# Patient Record
Sex: FEMALE | Race: OTHER | NOT HISPANIC OR LATINO
[De-identification: names, ages, dates, MRNs, and addresses within clinical notes are randomized per-mention and may not be internally consistent; named-entity substitution may affect disease eponyms.]

---

## 2018-03-02 PROBLEM — Z00.00 ENCOUNTER FOR PREVENTIVE HEALTH EXAMINATION: Status: ACTIVE | Noted: 2018-03-02

## 2018-03-05 ENCOUNTER — APPOINTMENT (OUTPATIENT)
Dept: BREAST CENTER | Facility: CLINIC | Age: 58
End: 2018-03-05
Payer: COMMERCIAL

## 2018-03-05 VITALS
HEART RATE: 100 BPM | BODY MASS INDEX: 27.09 KG/M2 | DIASTOLIC BLOOD PRESSURE: 67 MMHG | HEIGHT: 60 IN | TEMPERATURE: 97.5 F | SYSTOLIC BLOOD PRESSURE: 137 MMHG | WEIGHT: 138 LBS

## 2018-03-05 DIAGNOSIS — N63.10 UNSPECIFIED LUMP IN THE RIGHT BREAST, UNSPECIFIED QUADRANT: ICD-10-CM

## 2018-03-05 DIAGNOSIS — R92.8 OTHER ABNORMAL AND INCONCLUSIVE FINDINGS ON DIAGNOSTIC IMAGING OF BREAST: ICD-10-CM

## 2018-03-05 DIAGNOSIS — Z86.39 PERSONAL HISTORY OF OTHER ENDOCRINE, NUTRITIONAL AND METABOLIC DISEASE: ICD-10-CM

## 2018-03-05 DIAGNOSIS — Z78.9 OTHER SPECIFIED HEALTH STATUS: ICD-10-CM

## 2018-03-05 PROCEDURE — 99245 OFF/OP CONSLTJ NEW/EST HI 55: CPT

## 2018-03-05 RX ORDER — LEVOTHYROXINE SODIUM 137 UG/1
TABLET ORAL
Refills: 0 | Status: ACTIVE | COMMUNITY

## 2018-03-12 ENCOUNTER — FORM ENCOUNTER (OUTPATIENT)
Age: 58
End: 2018-03-12

## 2018-03-13 ENCOUNTER — OUTPATIENT (OUTPATIENT)
Dept: OUTPATIENT SERVICES | Facility: HOSPITAL | Age: 58
LOS: 1 days | End: 2018-03-13
Payer: COMMERCIAL

## 2018-03-13 ENCOUNTER — APPOINTMENT (OUTPATIENT)
Dept: ULTRASOUND IMAGING | Facility: HOSPITAL | Age: 58
End: 2018-03-13
Payer: COMMERCIAL

## 2018-03-13 ENCOUNTER — APPOINTMENT (OUTPATIENT)
Dept: MRI IMAGING | Facility: HOSPITAL | Age: 58
End: 2018-03-13
Payer: COMMERCIAL

## 2018-03-13 PROCEDURE — A9585: CPT

## 2018-03-13 PROCEDURE — 77059 MRI BREAST BILATERAL: CPT | Mod: 26

## 2018-03-13 PROCEDURE — 0159T: CPT | Mod: 26

## 2018-03-13 PROCEDURE — 76641 ULTRASOUND BREAST COMPLETE: CPT | Mod: 26,50

## 2018-03-13 PROCEDURE — C8937: CPT

## 2018-03-13 PROCEDURE — C8906: CPT

## 2018-03-13 PROCEDURE — 76641 ULTRASOUND BREAST COMPLETE: CPT

## 2018-03-16 ENCOUNTER — RESULT REVIEW (OUTPATIENT)
Age: 58
End: 2018-03-16

## 2018-03-16 ENCOUNTER — OUTPATIENT (OUTPATIENT)
Dept: OUTPATIENT SERVICES | Facility: HOSPITAL | Age: 58
LOS: 1 days | End: 2018-03-16
Payer: COMMERCIAL

## 2018-03-16 PROCEDURE — 88321 CONSLTJ&REPRT SLD PREP ELSWR: CPT

## 2018-03-19 ENCOUNTER — FORM ENCOUNTER (OUTPATIENT)
Age: 58
End: 2018-03-19

## 2018-03-19 DIAGNOSIS — C50.911 MALIGNANT NEOPLASM OF UNSPECIFIED SITE OF RIGHT FEMALE BREAST: ICD-10-CM

## 2018-03-19 LAB — SURGICAL PATHOLOGY STUDY: SIGNIFICANT CHANGE UP

## 2018-03-20 ENCOUNTER — APPOINTMENT (OUTPATIENT)
Dept: ULTRASOUND IMAGING | Facility: HOSPITAL | Age: 58
End: 2018-03-20
Payer: COMMERCIAL

## 2018-03-20 ENCOUNTER — OUTPATIENT (OUTPATIENT)
Dept: OUTPATIENT SERVICES | Facility: HOSPITAL | Age: 58
LOS: 1 days | End: 2018-03-20
Payer: COMMERCIAL

## 2018-03-20 ENCOUNTER — RESULT REVIEW (OUTPATIENT)
Age: 58
End: 2018-03-20

## 2018-03-20 PROCEDURE — 77065 DX MAMMO INCL CAD UNI: CPT

## 2018-03-20 PROCEDURE — A4648: CPT

## 2018-03-20 PROCEDURE — 88341 IMHCHEM/IMCYTCHM EA ADD ANTB: CPT

## 2018-03-20 PROCEDURE — 19083 BX BREAST 1ST LESION US IMAG: CPT

## 2018-03-20 PROCEDURE — 19083 BX BREAST 1ST LESION US IMAG: CPT | Mod: RT

## 2018-03-20 PROCEDURE — 77065 DX MAMMO INCL CAD UNI: CPT | Mod: 26,RT

## 2018-03-20 PROCEDURE — 88305 TISSUE EXAM BY PATHOLOGIST: CPT

## 2018-03-22 LAB — SURGICAL PATHOLOGY STUDY: SIGNIFICANT CHANGE UP

## 2018-03-27 ENCOUNTER — FORM ENCOUNTER (OUTPATIENT)
Age: 58
End: 2018-03-27

## 2018-03-28 ENCOUNTER — OUTPATIENT (OUTPATIENT)
Dept: OUTPATIENT SERVICES | Facility: HOSPITAL | Age: 58
LOS: 1 days | End: 2018-03-28
Payer: COMMERCIAL

## 2018-03-28 ENCOUNTER — RESULT REVIEW (OUTPATIENT)
Age: 58
End: 2018-03-28

## 2018-03-28 ENCOUNTER — FORM ENCOUNTER (OUTPATIENT)
Age: 58
End: 2018-03-28

## 2018-03-28 VITALS
RESPIRATION RATE: 18 BRPM | WEIGHT: 149.25 LBS | SYSTOLIC BLOOD PRESSURE: 118 MMHG | HEIGHT: 60 IN | HEART RATE: 78 BPM | DIASTOLIC BLOOD PRESSURE: 56 MMHG | TEMPERATURE: 97 F

## 2018-03-28 DIAGNOSIS — Z98.891 HISTORY OF UTERINE SCAR FROM PREVIOUS SURGERY: Chronic | ICD-10-CM

## 2018-03-28 DIAGNOSIS — Z98.890 OTHER SPECIFIED POSTPROCEDURAL STATES: Chronic | ICD-10-CM

## 2018-03-28 PROCEDURE — A9541: CPT

## 2018-03-28 PROCEDURE — 78195 LYMPH SYSTEM IMAGING: CPT | Mod: 26

## 2018-03-28 PROCEDURE — 78195 LYMPH SYSTEM IMAGING: CPT

## 2018-03-29 ENCOUNTER — APPOINTMENT (OUTPATIENT)
Dept: BREAST CENTER | Facility: HOSPITAL | Age: 58
End: 2018-03-29

## 2018-03-29 ENCOUNTER — APPOINTMENT (OUTPATIENT)
Dept: MAMMOGRAPHY | Facility: HOSPITAL | Age: 58
End: 2018-03-29

## 2018-03-29 ENCOUNTER — OUTPATIENT (OUTPATIENT)
Dept: OUTPATIENT SERVICES | Facility: HOSPITAL | Age: 58
LOS: 1 days | Discharge: HOME | End: 2018-03-29
Payer: COMMERCIAL

## 2018-03-29 VITALS
HEART RATE: 92 BPM | RESPIRATION RATE: 13 BRPM | DIASTOLIC BLOOD PRESSURE: 65 MMHG | OXYGEN SATURATION: 97 % | SYSTOLIC BLOOD PRESSURE: 111 MMHG

## 2018-03-29 DIAGNOSIS — Z98.890 OTHER SPECIFIED POSTPROCEDURAL STATES: Chronic | ICD-10-CM

## 2018-03-29 DIAGNOSIS — Z98.891 HISTORY OF UTERINE SCAR FROM PREVIOUS SURGERY: Chronic | ICD-10-CM

## 2018-03-29 LAB — GLUCOSE BLDC GLUCOMTR-MCNC: 121 MG/DL — HIGH (ref 70–99)

## 2018-03-29 PROCEDURE — 88305 TISSUE EXAM BY PATHOLOGIST: CPT

## 2018-03-29 PROCEDURE — 19301 PARTIAL MASTECTOMY: CPT | Mod: RT,GC

## 2018-03-29 PROCEDURE — 76098 X-RAY EXAM SURGICAL SPECIMEN: CPT | Mod: 26

## 2018-03-29 PROCEDURE — 19301 PARTIAL MASTECTOMY: CPT | Mod: RT

## 2018-03-29 PROCEDURE — 88341 IMHCHEM/IMCYTCHM EA ADD ANTB: CPT

## 2018-03-29 PROCEDURE — 77065 DX MAMMO INCL CAD UNI: CPT

## 2018-03-29 PROCEDURE — 19125 EXCISION BREAST LESION: CPT | Mod: 59,RT,GC

## 2018-03-29 PROCEDURE — 82962 GLUCOSE BLOOD TEST: CPT

## 2018-03-29 PROCEDURE — 38500 BIOPSY/REMOVAL LYMPH NODES: CPT | Mod: RT

## 2018-03-29 PROCEDURE — 19285 PERQ DEV BREAST 1ST US IMAG: CPT

## 2018-03-29 PROCEDURE — 19286 PERQ DEV BREAST ADD US IMAG: CPT

## 2018-03-29 PROCEDURE — 77065 DX MAMMO INCL CAD UNI: CPT | Mod: 26,RT

## 2018-03-29 PROCEDURE — 38900 IO MAP OF SENT LYMPH NODE: CPT

## 2018-03-29 PROCEDURE — 19286 PERQ DEV BREAST ADD US IMAG: CPT | Mod: RT

## 2018-03-29 PROCEDURE — 88307 TISSUE EXAM BY PATHOLOGIST: CPT

## 2018-03-29 PROCEDURE — 19285 PERQ DEV BREAST 1ST US IMAG: CPT | Mod: RT

## 2018-03-29 PROCEDURE — C1819: CPT

## 2018-03-29 PROCEDURE — 76098 X-RAY EXAM SURGICAL SPECIMEN: CPT

## 2018-03-29 RX ORDER — OXYCODONE AND ACETAMINOPHEN 5; 325 MG/1; MG/1
1 TABLET ORAL ONCE
Qty: 0 | Refills: 0 | Status: DISCONTINUED | OUTPATIENT
Start: 2018-03-29 | End: 2018-03-29

## 2018-03-29 RX ADMIN — OXYCODONE AND ACETAMINOPHEN 1 TABLET(S): 5; 325 TABLET ORAL at 11:43

## 2018-03-29 NOTE — PACU DISCHARGE NOTE - COMMENTS
VSS. Surical incision C/D/I. Tolerated PO fluids Discharge instructions given. Pt shows understanding.

## 2018-04-02 ENCOUNTER — CHART COPY (OUTPATIENT)
Age: 58
End: 2018-04-02

## 2018-04-04 ENCOUNTER — APPOINTMENT (OUTPATIENT)
Dept: BREAST CENTER | Facility: CLINIC | Age: 58
End: 2018-04-04
Payer: COMMERCIAL

## 2018-04-04 VITALS
DIASTOLIC BLOOD PRESSURE: 65 MMHG | WEIGHT: 138 LBS | SYSTOLIC BLOOD PRESSURE: 123 MMHG | BODY MASS INDEX: 27.09 KG/M2 | TEMPERATURE: 98.8 F | HEART RATE: 86 BPM | HEIGHT: 60 IN

## 2018-04-04 PROBLEM — C50.919 MALIGNANT NEOPLASM OF UNSPECIFIED SITE OF UNSPECIFIED FEMALE BREAST: Chronic | Status: ACTIVE | Noted: 2018-03-28

## 2018-04-04 PROBLEM — E66.3 OVERWEIGHT: Chronic | Status: ACTIVE | Noted: 2018-03-28

## 2018-04-04 PROBLEM — E05.90 THYROTOXICOSIS, UNSPECIFIED WITHOUT THYROTOXIC CRISIS OR STORM: Chronic | Status: ACTIVE | Noted: 2018-03-28

## 2018-04-04 PROCEDURE — 99024 POSTOP FOLLOW-UP VISIT: CPT

## 2018-04-06 ENCOUNTER — CHART COPY (OUTPATIENT)
Age: 58
End: 2018-04-06

## 2018-04-06 LAB — SURGICAL PATHOLOGY STUDY: SIGNIFICANT CHANGE UP

## 2018-04-10 ENCOUNTER — APPOINTMENT (OUTPATIENT)
Age: 58
End: 2018-04-10
Payer: COMMERCIAL

## 2018-04-10 VITALS
HEIGHT: 60 IN | BODY MASS INDEX: 27.37 KG/M2 | SYSTOLIC BLOOD PRESSURE: 120 MMHG | HEART RATE: 84 BPM | RESPIRATION RATE: 18 BRPM | OXYGEN SATURATION: 99 % | DIASTOLIC BLOOD PRESSURE: 71 MMHG | WEIGHT: 139.4 LBS

## 2018-04-10 DIAGNOSIS — E11.9 TYPE 2 DIABETES MELLITUS W/OUT COMPLICATIONS: ICD-10-CM

## 2018-04-10 DIAGNOSIS — Z80.3 FAMILY HISTORY OF MALIGNANT NEOPLASM OF BREAST: ICD-10-CM

## 2018-04-10 PROCEDURE — 99244 OFF/OP CNSLTJ NEW/EST MOD 40: CPT | Mod: 25

## 2018-04-10 PROCEDURE — 77263 THER RADIOLOGY TX PLNG CPLX: CPT

## 2018-04-17 PROCEDURE — 77334 RADIATION TREATMENT AID(S): CPT | Mod: 26

## 2018-04-17 PROCEDURE — 77290 THER RAD SIMULAJ FIELD CPLX: CPT | Mod: 26

## 2018-05-03 ENCOUNTER — CHART COPY (OUTPATIENT)
Age: 58
End: 2018-05-03

## 2018-05-09 PROCEDURE — 77334 RADIATION TREATMENT AID(S): CPT | Mod: 26

## 2018-05-09 PROCEDURE — 77300 RADIATION THERAPY DOSE PLAN: CPT | Mod: 26

## 2018-05-09 PROCEDURE — 77295 3-D RADIOTHERAPY PLAN: CPT | Mod: 26

## 2018-05-14 PROCEDURE — 77280 THER RAD SIMULAJ FIELD SMPL: CPT | Mod: 26

## 2018-05-15 PROCEDURE — 77014: CPT | Mod: 26

## 2018-05-16 PROCEDURE — 77014: CPT | Mod: 26

## 2018-05-17 PROCEDURE — 77014: CPT | Mod: 26

## 2018-05-18 PROCEDURE — 77014: CPT | Mod: 26

## 2018-05-21 PROCEDURE — 77427 RADIATION TX MANAGEMENT X5: CPT

## 2018-05-21 PROCEDURE — 77014: CPT | Mod: 26

## 2018-05-22 PROCEDURE — 77014: CPT | Mod: 26

## 2018-05-23 PROCEDURE — 77014: CPT | Mod: 26

## 2018-05-24 PROCEDURE — 77014: CPT | Mod: 26

## 2018-05-25 PROCEDURE — 77014: CPT | Mod: 26

## 2018-05-29 ENCOUNTER — APPOINTMENT (OUTPATIENT)
Dept: BREAST CENTER | Facility: CLINIC | Age: 58
End: 2018-05-29

## 2018-05-29 PROCEDURE — 77427 RADIATION TX MANAGEMENT X5: CPT

## 2018-05-29 PROCEDURE — 77014: CPT | Mod: 26

## 2018-05-30 PROCEDURE — 77014: CPT | Mod: 26

## 2018-05-31 PROCEDURE — 77014: CPT | Mod: 26

## 2018-06-01 PROCEDURE — 77014: CPT | Mod: 26

## 2018-06-04 PROCEDURE — 77014: CPT | Mod: 26

## 2018-06-05 PROCEDURE — 77427 RADIATION TX MANAGEMENT X5: CPT

## 2018-06-05 PROCEDURE — 77014: CPT | Mod: 26

## 2018-06-06 PROCEDURE — 77014: CPT | Mod: 26

## 2018-06-07 PROCEDURE — 77280 THER RAD SIMULAJ FIELD SMPL: CPT | Mod: 26

## 2018-06-07 PROCEDURE — 77014: CPT | Mod: 26

## 2018-06-07 PROCEDURE — 77300 RADIATION THERAPY DOSE PLAN: CPT | Mod: 26

## 2018-06-07 PROCEDURE — 77334 RADIATION TREATMENT AID(S): CPT | Mod: 26

## 2018-06-08 PROCEDURE — 77014: CPT | Mod: 26

## 2018-06-11 PROCEDURE — 77014: CPT | Mod: 26

## 2018-06-12 PROCEDURE — 77427 RADIATION TX MANAGEMENT X5: CPT

## 2018-06-12 PROCEDURE — 77014: CPT | Mod: 26

## 2018-07-10 ENCOUNTER — APPOINTMENT (OUTPATIENT)
Dept: BREAST CENTER | Facility: CLINIC | Age: 58
End: 2018-07-10
Payer: COMMERCIAL

## 2018-07-10 VITALS
HEIGHT: 60 IN | HEART RATE: 89 BPM | BODY MASS INDEX: 27.29 KG/M2 | WEIGHT: 139 LBS | DIASTOLIC BLOOD PRESSURE: 63 MMHG | SYSTOLIC BLOOD PRESSURE: 99 MMHG

## 2018-07-10 PROCEDURE — 99213 OFFICE O/P EST LOW 20 MIN: CPT

## 2018-07-17 ENCOUNTER — APPOINTMENT (OUTPATIENT)
Age: 58
End: 2018-07-17

## 2018-07-17 VITALS
DIASTOLIC BLOOD PRESSURE: 70 MMHG | BODY MASS INDEX: 26.43 KG/M2 | HEART RATE: 95 BPM | WEIGHT: 140 LBS | HEIGHT: 61 IN | RESPIRATION RATE: 14 BRPM | SYSTOLIC BLOOD PRESSURE: 111 MMHG | OXYGEN SATURATION: 99 %

## 2018-09-05 ENCOUNTER — APPOINTMENT (OUTPATIENT)
Dept: MAMMOGRAPHY | Facility: HOSPITAL | Age: 58
End: 2018-09-05
Payer: COMMERCIAL

## 2018-09-05 ENCOUNTER — OUTPATIENT (OUTPATIENT)
Dept: OUTPATIENT SERVICES | Facility: HOSPITAL | Age: 58
LOS: 1 days | End: 2018-09-05
Payer: COMMERCIAL

## 2018-09-05 ENCOUNTER — APPOINTMENT (OUTPATIENT)
Dept: ULTRASOUND IMAGING | Facility: HOSPITAL | Age: 58
End: 2018-09-05
Payer: COMMERCIAL

## 2018-09-05 DIAGNOSIS — Z98.890 OTHER SPECIFIED POSTPROCEDURAL STATES: Chronic | ICD-10-CM

## 2018-09-05 DIAGNOSIS — Z98.891 HISTORY OF UTERINE SCAR FROM PREVIOUS SURGERY: Chronic | ICD-10-CM

## 2018-09-05 PROCEDURE — 76641 ULTRASOUND BREAST COMPLETE: CPT | Mod: 26,50

## 2018-09-05 PROCEDURE — 77065 DX MAMMO INCL CAD UNI: CPT

## 2018-09-05 PROCEDURE — G0279: CPT

## 2018-09-05 PROCEDURE — 77065 DX MAMMO INCL CAD UNI: CPT | Mod: 26,RT

## 2018-09-05 PROCEDURE — 76641 ULTRASOUND BREAST COMPLETE: CPT

## 2018-09-05 PROCEDURE — G0279: CPT | Mod: 26

## 2018-09-12 ENCOUNTER — APPOINTMENT (OUTPATIENT)
Dept: BREAST CENTER | Facility: CLINIC | Age: 58
End: 2018-09-12
Payer: COMMERCIAL

## 2018-09-12 VITALS
SYSTOLIC BLOOD PRESSURE: 129 MMHG | HEART RATE: 102 BPM | HEIGHT: 61 IN | DIASTOLIC BLOOD PRESSURE: 83 MMHG | WEIGHT: 140 LBS | BODY MASS INDEX: 26.43 KG/M2

## 2018-09-12 DIAGNOSIS — Z12.31 ENCOUNTER FOR SCREENING MAMMOGRAM FOR MALIGNANT NEOPLASM OF BREAST: ICD-10-CM

## 2018-09-12 DIAGNOSIS — Z85.3 PERSONAL HISTORY OF MALIGNANT NEOPLASM OF BREAST: ICD-10-CM

## 2018-09-12 DIAGNOSIS — Z87.891 PERSONAL HISTORY OF NICOTINE DEPENDENCE: ICD-10-CM

## 2018-09-12 PROCEDURE — 99213 OFFICE O/P EST LOW 20 MIN: CPT

## 2018-10-16 ENCOUNTER — APPOINTMENT (OUTPATIENT)
Dept: RADIATION ONCOLOGY | Facility: CLINIC | Age: 58
End: 2018-10-16
Payer: COMMERCIAL

## 2018-10-16 PROCEDURE — 99214 OFFICE O/P EST MOD 30 MIN: CPT

## 2018-10-16 RX ORDER — ANASTROZOLE TABLETS 1 MG/1
1 TABLET ORAL
Refills: 0 | Status: ACTIVE | COMMUNITY

## 2018-10-16 NOTE — REVIEW OF SYSTEMS
[Negative] : Respiratory [Fever] : no fever [Chills] : no chills [Visual Disturbances] : no visual disturbances [Chest Pain] : no chest pain [Palpitations] : no palpitations [Shortness Of Breath] : no shortness of breath [Cough] : no cough [Hot Flashes] : no hot flashes [Swollen Glands] : no swollen glands [FreeTextEntry2] : reports fatigue that is stable.  [de-identified] : reports no skin darkening or reddening

## 2018-10-16 NOTE — HISTORY OF PRESENT ILLNESS
[FreeTextEntry1] : Ms. 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 She is s/p  right breast wide excision with SLNB.\par \par 10/16/18- FOLLOW UP\par Ms. Chan returns for routine follow up. She was last seen on 7/17/18; plan at the time was to continue follow up with Dr. Swan and Dr. Conway, and to obtain imaging as per Dr. Swan. She completed her right breast mammo and bilateral US on 9/5/18 showing postoperative and postradiation changes in the right breast.  \par \par She last saw Dr. Conway approx one month ago, continues on Anastrozole (started end of June 2018). \par She last saw Dr. Swan on 9/12/18; plan was to see lymphedema specialist and follow up in three months. However, Dr. Conway advised patient to hold off, and patient reports the edema has gotten significantly better. \par \par Today, she reports she is feeling well. Denies fever, chills, SOB, CP, breast pain. She notes hot flashes, but she was experiencing them before starting Anastrozole. \par \par \par 7/17/18-PTE\par Ms. 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 She is s/p  right breast wide excision with SLNB.\par She has f/u with Dr. Swan  and was noted to have a left breast mass seen on sono. She will undergo a bilateral sono and right breast mammo in September. She is taking anastrazole under the care of Dr. Conway\par \par ONCOLOGY HISTORY\par Ms Geri Chan is a 57 year old female who presents with invasive ductal carcinoma and LCIS of the right breast.\par Her PMH includes a biopsy of right breast lesion in 2016 which was benign and  excision of right breast nevus few years ago. Her mother was diagnosed with breast cancer at  70.\par \par On 1/31/18 she had her annual breast mammogram showing nodular asymmetry in the right breast MLO 14 cm FN. \par \par On 2/5/18 she underwent a  right breast mammo and ultrasound showing 1 by 0.6 cm hypoechoic mass in the right breast at 11;00 20 cm FN. On 2/7/18 pt had an ultrasound of the right axilla showing 2/8 by 0/7 lymph node ; and 2/6 by 0.8 cm lymph node in the left axilla.\par \par On 2/16/18 pt had core biopsy of the right breast mass  at [facility] showing invasive moderately to poorly differentiated ductal carcinoma  measuring 10 mm  with PNI, ER/CT positive, HER-2 neutral (path [place] and reviewed by [facility])\par \par 3/5/18- Right mammo showed 2.2 cm irregular nodular density in the right axillary region 14 cm FN.  Right breast ultrasound  showed  a 1 x 0.6 cm hypoechoic mass at 11;00 20 cm FN\par \par 3/13/18-MRI breasts showed\par right breast- 2.1 x 0.9 x 1.7 cm enhancing lesion, 1.4 x 0.8 x 0.4 cm confluent enhancement at 9:00, 7 cm FN\par left breast-0.6 x 0.4 x 0.6 cm enhancing lesion at 1:00, 5 cm FN, 0.9 x 0.7 x 0.6 cm intramammary lymph node at 3:00, 8cm FN\par \par 3/20/10-Core biopsy right breast at [facility] by Dr. Daniella Majano- atypical lobular hyperplasia\par \par She underwent a right breast lumpectomy on 3/29/18 by Dr. Swan. Pathology showed  \par 1-right breast 12:00 invasive ductal carcinoma  1.4 cm, grade G2. Margins negative greater than 10 mm. 3 sentinel nodes negative.\par \par Today pt. feels well. Still with right breast pain 2/10. Here to discuss RT options\par

## 2018-10-16 NOTE — VITALS
[Least Pain Intensity: 0/10] : 0/10 [Pain Location: ___] : Pain Location: [unfilled] [NoTreatment Scheduled] : no treatment scheduled [90: Able to carry normal activity; minor signs or symptoms of disease.] : 90: Able to carry normal activity; minor signs or symptoms of disease.  [ECOG Performance Status: 1 - Restricted in physically strenuous activity but ambulatory and able to carry out work of a light or sedentary nature] : Performance Status: 1 - Restricted in physically strenuous activity but ambulatory and able to carry out work of a light or sedentary nature, e.g., light house work, office work [Maximal Pain Intensity: 0/10] : 0/10

## 2018-10-16 NOTE — PHYSICAL EXAM
[General Appearance - Alert] : alert [General Appearance - In No Acute Distress] : in no acute distress [Sclera] : the sclera and conjunctiva were normal [Extraocular Movements] : extraocular movements were intact [Outer Ear] : the ears and nose were normal in appearance [Hearing Threshold Finger Rub Not [place]] : hearing was normal [] : no respiratory distress [Heart Rate And Rhythm] : heart rate and rhythm were normal [Oriented To Time, Place, And Person] : oriented to person, place, and time [General Appearance - Well Developed] : well developed [General Appearance - Well Nourished] : well nourished [Respiration, Rhythm And Depth] : normal respiratory rhythm and effort [Auscultation Breath Sounds / Voice Sounds] : lungs were clear to auscultation bilaterally [Heart Sounds] : normal S1 and S2 [Bowel Sounds] : normal bowel sounds [Abdomen Soft] : soft [Nondistended] : nondistended [Abdomen Tenderness] : non-tender [Cervical Lymph Nodes Enlarged Posterior Bilaterally] : posterior cervical [Cervical Lymph Nodes Enlarged Anterior Bilaterally] : anterior cervical [Supraclavicular Lymph Nodes Enlarged Bilaterally] : supraclavicular [Axillary Lymph Nodes Enlarged Bilaterally] : axillary [Musculoskeletal - Swelling] : no joint swelling [Motor Tone] : muscle strength and tone were normal [Normal] : no focal deficits [de-identified] : post-surgical changes palpated. slight hyperpigmentation noted.

## 2019-01-15 ENCOUNTER — APPOINTMENT (OUTPATIENT)
Dept: BREAST CENTER | Facility: CLINIC | Age: 59
End: 2019-01-15

## 2019-02-26 NOTE — VITALS
[Maximal Pain Intensity: 0/10] : 0/10 [Least Pain Intensity: 0/10] : 0/10 [Pain Location: ___] : Pain Location: [unfilled] [NoTreatment Scheduled] : no treatment scheduled [90: Able to carry normal activity; minor signs or symptoms of disease.] : 90: Able to carry normal activity; minor signs or symptoms of disease.  [ECOG Performance Status: 1 - Restricted in physically strenuous activity but ambulatory and able to carry out work of a light or sedentary nature] : Performance Status: 1 - Restricted in physically strenuous activity but ambulatory and able to carry out work of a light or sedentary nature, e.g., light house work, office work

## 2019-02-27 ENCOUNTER — APPOINTMENT (OUTPATIENT)
Dept: RADIATION ONCOLOGY | Facility: CLINIC | Age: 59
End: 2019-02-27
Payer: COMMERCIAL

## 2019-02-27 VITALS
DIASTOLIC BLOOD PRESSURE: 67 MMHG | BODY MASS INDEX: 26.43 KG/M2 | WEIGHT: 140 LBS | TEMPERATURE: 98 F | HEIGHT: 61 IN | HEART RATE: 102 BPM | OXYGEN SATURATION: 97 % | SYSTOLIC BLOOD PRESSURE: 103 MMHG | RESPIRATION RATE: 14 BRPM

## 2019-02-27 PROCEDURE — 99214 OFFICE O/P EST MOD 30 MIN: CPT

## 2019-02-28 NOTE — REVIEW OF SYSTEMS
[Negative] : Psychiatric [Fever] : no fever [Chills] : no chills [Visual Disturbances] : no visual disturbances [Chest Pain] : no chest pain [Palpitations] : no palpitations [Shortness Of Breath] : no shortness of breath [Cough] : no cough [Hot Flashes] : no hot flashes [Swollen Glands] : no swollen glands [FreeTextEntry2] : reports fatigue that is stable.  [de-identified] : reports no skin darkening or reddening

## 2019-02-28 NOTE — HISTORY OF PRESENT ILLNESS
[FreeTextEntry1] : Ms. Chan completed 5040 cGy for a malignant neoplasm of upper-outer quadrant of the right breast ER/AL positive, HER 2 negative from 5/15/18-6/12/18. She is s/p  right breast wide excision with SLNB.\par \par 2/27/19 Follow up\par Ms Chan presents for routine follow up. Last visit with Dr Conway was ___, continues on Anastrazole. Last visit with Dr Swan was __. Breast imaging? Today, she reports XXX \par \par ____________________________________\par 10/16/18- FOLLOW UP\par Ms. Chan returns for routine follow up. She was last seen on 7/17/18; plan at the time was to continue follow up with Dr. Swan and Dr. Conway, and to obtain imaging as per Dr. Swan. She completed her right breast mammo and bilateral US on 9/5/18 showing postoperative and postradiation changes in the right breast.  \par \par She last saw Dr. Conway approx one month ago, continues on Anastrozole (started end of June 2018). \par She last saw Dr. Swan on 9/12/18; plan was to see lymphedema specialist and follow up in three months. However, Dr. Conway advised patient to hold off, and patient reports the edema has gotten significantly better. \par \par Today, she reports she is feeling well. Denies fever, chills, SOB, CP, breast pain. She notes hot flashes, but she was experiencing them before starting Anastrozole. \par \par \par 7/17/18-PTE\par Ms. Chan completed 5040 cGy for a malignant neoplasm of upper-outer quadrant of the right breast ER/AL positive, HER 2 negative from 5/15/18-6/12/18. She is s/p  right breast wide excision with SLNB.\par She has f/u with Dr. Swan  and was noted to have a left breast mass seen on sono. She will undergo a bilateral sono and right breast mammo in September. She is taking anastrazole under the care of Dr. Conway\par \par ONCOLOGY HISTORY\par Ms Geri Chan is a 57 year old female who presents with invasive ductal carcinoma and LCIS of the right breast.\par Her PMH includes a biopsy of right breast lesion in 2016 which was benign and  excision of right breast nevus few years ago. Her mother was diagnosed with breast cancer at  70.\par \par On 1/31/18 she had her annual breast mammogram showing nodular asymmetry in the right breast MLO 14 cm FN. \par \par On 2/5/18 she underwent a  right breast mammo and ultrasound showing 1 by 0.6 cm hypoechoic mass in the right breast at 11;00 20 cm FN. On 2/7/18 pt had an ultrasound of the right axilla showing 2/8 by 0/7 lymph node ; and 2/6 by 0.8 cm lymph node in the left axilla.\par \par On 2/16/18 pt had core biopsy of the right breast mass  at [facility]) showing invasive moderately to poorly differentiated ductal carcinoma  measuring 10 mm  with PNI, ER/AL positive, HER-2 neutral (path [place] and reviewed by [facility])\par \par 3/5/18- Right mammo showed 2.2 cm irregular nodular density in the right axillary region 14 cm FN.  Right breast ultrasound  showed  a 1 x 0.6 cm hypoechoic mass at 11;00 20 cm FN\par \par 3/13/18-MRI breasts showed\par right breast- 2.1 x 0.9 x 1.7 cm enhancing lesion, 1.4 x 0.8 x 0.4 cm confluent enhancement at 9:00, 7 cm FN\par left breast-0.6 x 0.4 x 0.6 cm enhancing lesion at 1:00, 5 cm FN, 0.9 x 0.7 x 0.6 cm intramammary lymph node at 3:00, 8cm FN\par \par 3/20/10-Core biopsy right breast at [facility] by Dr. Daniella Majano- atypical lobular hyperplasia\par \par She underwent a right breast lumpectomy on 3/29/18 by Dr. Swan. Pathology showed  \par 1-right breast 12:00 invasive ductal carcinoma  1.4 cm, grade G2. Margins negative greater than 10 mm. 3 sentinel nodes negative.\par \par Today pt. feels well. Still with right breast pain 2/10. Here to discuss RT options\par

## 2019-02-28 NOTE — PHYSICAL EXAM
[General Appearance - Well Developed] : well developed [General Appearance - Well Nourished] : well nourished [General Appearance - Alert] : alert [General Appearance - In No Acute Distress] : in no acute distress [Sclera] : the sclera and conjunctiva were normal [Extraocular Movements] : extraocular movements were intact [Outer Ear] : the ears and nose were normal in appearance [Hearing Threshold Finger Rub Not [place]] : hearing was normal [] : no respiratory distress [Respiration, Rhythm And Depth] : normal respiratory rhythm and effort [Auscultation Breath Sounds / Voice Sounds] : lungs were clear to auscultation bilaterally [Heart Rate And Rhythm] : heart rate and rhythm were normal [Heart Sounds] : normal S1 and S2 [Bowel Sounds] : normal bowel sounds [Abdomen Soft] : soft [Nondistended] : nondistended [Abdomen Tenderness] : non-tender [Cervical Lymph Nodes Enlarged Posterior Bilaterally] : posterior cervical [Cervical Lymph Nodes Enlarged Anterior Bilaterally] : anterior cervical [Supraclavicular Lymph Nodes Enlarged Bilaterally] : supraclavicular [Axillary Lymph Nodes Enlarged Bilaterally] : axillary [Musculoskeletal - Swelling] : no joint swelling [Motor Tone] : muscle strength and tone were normal [Normal] : no focal deficits [Oriented To Time, Place, And Person] : oriented to person, place, and time [de-identified] : post-surgical changes palpated. slight hyperpigmentation noted.

## 2019-08-14 ENCOUNTER — APPOINTMENT (OUTPATIENT)
Dept: RADIATION ONCOLOGY | Facility: CLINIC | Age: 59
End: 2019-08-14

## 2019-08-27 ENCOUNTER — APPOINTMENT (OUTPATIENT)
Dept: RADIATION ONCOLOGY | Facility: CLINIC | Age: 59
End: 2019-08-27
Payer: COMMERCIAL

## 2019-08-27 DIAGNOSIS — C50.911 MALIGNANT NEOPLASM OF UNSPECIFIED SITE OF RIGHT FEMALE BREAST: ICD-10-CM

## 2019-08-27 PROCEDURE — 99214 OFFICE O/P EST MOD 30 MIN: CPT

## 2019-08-27 NOTE — REVIEW OF SYSTEMS
[Negative] : Integumentary [Breast Pain: Grade 0] : Breast Pain: Grade 0 [Fever] : no fever [Chills] : no chills [Visual Disturbances] : no visual disturbances [Palpitations] : no palpitations [Chest Pain] : no chest pain [Shortness Of Breath] : no shortness of breath [Hot Flashes] : no hot flashes [Cough] : no cough [Swollen Glands] : no swollen glands [FreeTextEntry2] : reports fatigue that is stable.

## 2019-08-27 NOTE — HISTORY OF PRESENT ILLNESS
[FreeTextEntry1] : Ms. Chan completed 5240 cGy over 20 fractions to the RIGHT breast from 5/15/18- 6/12/18 for a [id number] ER positive, OK positive, HER-2 negative malignant neoplasm of upper-outer quadrant of the right breast, AJCC stage IB. She is s/p right breast wide excision with SLNB.\par \par 8/27/19- FOLLOW UP\par Ms. Chan returns for routine follow up. When she was last seen on 2/27/19, she was GENET; plan was to continue follow up with Dr. Conway and breast surgeon. She last saw Dr. Conway approx one month ago; continues on Anastrozole. She has not followed up with breast surgery; states her PCP is ordering breast imaging. As per patient, her last breast imaging was in the past six months and was reportedly benign.\par \par Today, she reports she is feeling well. Denies breast pain, edema, palpable masses, CP, SOB, or any other complaints. \par \par \par \par \par ____________________________________\par 10/16/18- FOLLOW UP\par Ms. Chan returns for routine follow up. She was last seen on 7/17/18; plan at the time was to continue follow up with Dr. Swan and Dr. Conway, and to obtain imaging as per Dr. Swan. She completed her right breast mammo and bilateral US on 9/5/18 showing postoperative and postradiation changes in the right breast.  \par \par She last saw Dr. Conway approx one month ago, continues on Anastrozole (started end of June 2018). \par She last saw Dr. Swan on 9/12/18; plan was to see lymphedema specialist and follow up in three months. However, Dr. Conway advised patient to hold off, and patient reports the edema has gotten significantly better. \par \par Today, she reports she is feeling well. Denies fever, chills, SOB, CP, breast pain. She notes hot flashes, but she was experiencing them before starting Anastrozole. \par \par \par 7/17/18-PTE\par Ms. Chan completed 5040 cGy for a malignant neoplasm of upper-outer quadrant of the right breast ER/OK positive, HER 2 negative from 5/15/18-6/12/18. She is s/p  right breast wide excision with SLNB.\par She has f/u with Dr. Swan  and was noted to have a left breast mass seen on sono. She will undergo a bilateral sono and right breast mammo in September. She is taking anastrazole under the care of Dr. Conway\par \par ONCOLOGY HISTORY\par Ms Geri Chan is a 57 year old female who presents with invasive ductal carcinoma and LCIS of the right breast.\par Her PMH includes a biopsy of right breast lesion in 2016 which was benign and  excision of right breast nevus few years ago. Her mother was diagnosed with breast cancer at  70.\par \par On 1/31/18 she had her annual breast mammogram showing nodular asymmetry in the right breast MLO 14 cm FN. \par \par On 2/5/18 she underwent a  right breast mammo and ultrasound showing 1 by 0.6 cm hypoechoic mass in the right breast at 11;00 20 cm FN. On 2/7/18 pt had an ultrasound of the right axilla showing 2/8 by 0/7 lymph node ; and 2/6 by 0.8 cm lymph node in the left axilla.\par \par On 2/16/18 pt had core biopsy of the right breast mass  at [facility]) showing invasive moderately to poorly differentiated ductal carcinoma  measuring 10 mm  with PNI, ER/OK positive, HER-2 neutral (path [place] and reviewed by [facility])\par \par 3/5/18- Right mammo showed 2.2 cm irregular nodular density in the right axillary region 14 cm FN.  Right breast ultrasound  showed  a 1 x 0.6 cm hypoechoic mass at 11;00 20 cm FN\par \par 3/13/18-MRI breasts showed\par right breast- 2.1 x 0.9 x 1.7 cm enhancing lesion, 1.4 x 0.8 x 0.4 cm confluent enhancement at 9:00, 7 cm FN\par left breast-0.6 x 0.4 x 0.6 cm enhancing lesion at 1:00, 5 cm FN, 0.9 x 0.7 x 0.6 cm intramammary lymph node at 3:00, 8cm FN\par \par 3/20/10-Core biopsy right breast at [facility] by Dr. Daniella Majano- atypical lobular hyperplasia\par \par She underwent a right breast lumpectomy on 3/29/18 by Dr. Swan. Pathology showed  \par 1-right breast 12:00 invasive ductal carcinoma  1.4 cm, grade G2. Margins negative greater than 10 mm. 3 sentinel nodes negative.\par \par Today pt. feels well. Still with right breast pain 2/10. Here to discuss RT options\par

## 2019-08-27 NOTE — PHYSICAL EXAM
[General Appearance - Well Developed] : well developed [General Appearance - Well Nourished] : well nourished [General Appearance - Alert] : alert [Extraocular Movements] : extraocular movements were intact [General Appearance - In No Acute Distress] : in no acute distress [Sclera] : the sclera and conjunctiva were normal [Outer Ear] : the ears and nose were normal in appearance [Hearing Threshold Finger Rub Not [place]] : hearing was normal [Respiration, Rhythm And Depth] : normal respiratory rhythm and effort [] : no respiratory distress [Auscultation Breath Sounds / Voice Sounds] : lungs were clear to auscultation bilaterally [Heart Sounds] : normal S1 and S2 [Heart Rate And Rhythm] : heart rate and rhythm were normal [Abdomen Soft] : soft [Bowel Sounds] : normal bowel sounds [Abdomen Tenderness] : non-tender [Nondistended] : nondistended [Cervical Lymph Nodes Enlarged Posterior Bilaterally] : posterior cervical [Cervical Lymph Nodes Enlarged Anterior Bilaterally] : anterior cervical [Supraclavicular Lymph Nodes Enlarged Bilaterally] : supraclavicular [Axillary Lymph Nodes Enlarged Bilaterally] : axillary [Musculoskeletal - Swelling] : no joint swelling [Motor Tone] : muscle strength and tone were normal [Oriented To Time, Place, And Person] : oriented to person, place, and time [Normal] : no focal deficits [No UE Edema] : there is no upper extremity edema [de-identified] : post-surgical changes palpated.

## 2020-02-20 NOTE — REVIEW OF SYSTEMS
[Negative] : Psychiatric [Breast Pain: Grade 0] : Breast Pain: Grade 0 [Fever] : no fever [Chills] : no chills [Visual Disturbances] : no visual disturbances [Palpitations] : no palpitations [Chest Pain] : no chest pain [Shortness Of Breath] : no shortness of breath [Cough] : no cough [Hot Flashes] : no hot flashes [FreeTextEntry2] : reports fatigue that is stable.  [Swollen Glands] : no swollen glands

## 2020-02-20 NOTE — PHYSICAL EXAM
[General Appearance - Well Developed] : well developed [General Appearance - Well Nourished] : well nourished [General Appearance - Alert] : alert [General Appearance - In No Acute Distress] : in no acute distress [Sclera] : the sclera and conjunctiva were normal [Extraocular Movements] : extraocular movements were intact [Outer Ear] : the ears and nose were normal in appearance [Hearing Threshold Finger Rub Not [place]] : hearing was normal [] : no respiratory distress [Respiration, Rhythm And Depth] : normal respiratory rhythm and effort [Auscultation Breath Sounds / Voice Sounds] : lungs were clear to auscultation bilaterally [Heart Rate And Rhythm] : heart rate and rhythm were normal [Heart Sounds] : normal S1 and S2 [No UE Edema] : there is no upper extremity edema [Bowel Sounds] : normal bowel sounds [Abdomen Soft] : soft [Nondistended] : nondistended [Abdomen Tenderness] : non-tender [Cervical Lymph Nodes Enlarged Posterior Bilaterally] : posterior cervical [Cervical Lymph Nodes Enlarged Anterior Bilaterally] : anterior cervical [Supraclavicular Lymph Nodes Enlarged Bilaterally] : supraclavicular [Axillary Lymph Nodes Enlarged Bilaterally] : axillary [Musculoskeletal - Swelling] : no joint swelling [Motor Tone] : muscle strength and tone were normal [Normal] : no focal deficits [Oriented To Time, Place, And Person] : oriented to person, place, and time [de-identified] : post-surgical changes palpated.

## 2020-02-20 NOTE — HISTORY OF PRESENT ILLNESS
[FreeTextEntry1] : Ms. 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 She is s/p right breast wide excision with SLNB. She is taking Anastrozole under the care of Dr. Conway. \par \par 2/26/2020- FOLLOW UP\par Ms. Chan returns for routine follow up. When she was last seen on 8/27/19, she was doing well; plan was to continue follow up with Dr. Conway and PCP who is managing the breast imaging. She last saw Dr. Conway __; continues on Anastrozole. Last breast imaging__\par \par Today, she XXX\par \par _________________\par 8/27/19- FOLLOW UP\par Ms. Chan returns for routine follow up. When she was last seen on 2/27/19, she was GENET; plan was to continue follow up with Dr. Conway and breast surgeon. She last saw Dr. Conway approx one month ago; continues on Anastrozole. She has not followed up with breast surgery; states her PCP is ordering breast imaging. As per patient, her last breast imaging was in the past six months and was reportedly benign.\par \par Today, she reports she is feeling well. Denies breast pain, edema, palpable masses, CP, SOB, or any other complaints. \par \par \par ____________________________________\par 10/16/18- FOLLOW UP\par Ms. Chan returns for routine follow up. She was last seen on 7/17/18; plan at the time was to continue follow up with Dr. Swan and Dr. Conway, and to obtain imaging as per Dr. Swan. She completed her right breast mammo and bilateral US on 9/5/18 showing postoperative and postradiation changes in the right breast.  \par \par She last saw Dr. Conway approx one month ago, continues on Anastrozole (started end of June 2018). \par She last saw Dr. Swan on 9/12/18; plan was to see lymphedema specialist and follow up in three months. However, Dr. Conway advised patient to hold off, and patient reports the edema has gotten significantly better. \par \par Today, she reports she is feeling well. Denies fever, chills, SOB, CP, breast pain. She notes hot flashes, but she was experiencing them before starting Anastrozole. \par \par \par 7/17/18-PTE\par Ms. Chan completed 5040 cGy for a malignant neoplasm of upper-outer quadrant of the right breast ER/HI positive, HER 2 negative from 5/15/18-6/12/18. She is s/p  right breast wide excision with SLNB.\par She has f/u with Dr. Swan  and was noted to have a left breast mass seen on sono. She will undergo a bilateral sono and right breast mammo in September. She is taking anastrazole under the care of Dr. Conway\par \par ONCOLOGY HISTORY\par Ms Geri Chan is a 57 year old female who presents with invasive ductal carcinoma and LCIS of the right breast.\par Her PMH includes a biopsy of right breast lesion in 2016 which was benign and  excision of right breast nevus few years ago. Her mother was diagnosed with breast cancer at  70.\par \par On 1/31/18 she had her annual breast mammogram showing nodular asymmetry in the right breast MLO 14 cm FN. \par \par On 2/5/18 she underwent a  right breast mammo and ultrasound showing 1 by 0.6 cm hypoechoic mass in the right breast at 11;00 20 cm FN. On 2/7/18 pt had an ultrasound of the right axilla showing 2/8 by 0/7 lymph node ; and 2/6 by 0.8 cm lymph node in the left axilla.\par \par On 2/16/18 pt had core biopsy of the right breast mass  at [facility]) showing invasive moderately to poorly differentiated ductal carcinoma  measuring 10 mm  with PNI, ER/HI positive, HER-2 neutral (path [facility] and reviewed by [facility])\par \par 3/5/18- Right mammo showed 2.2 cm irregular nodular density in the right axillary region 14 cm FN.  Right breast ultrasound  showed  a 1 x 0.6 cm hypoechoic mass at 11;00 20 cm FN\par \par 3/13/18-MRI breasts showed\par right breast- 2.1 x 0.9 x 1.7 cm enhancing lesion, 1.4 x 0.8 x 0.4 cm confluent enhancement at 9:00, 7 cm FN\par left breast-0.6 x 0.4 x 0.6 cm enhancing lesion at 1:00, 5 cm FN, 0.9 x 0.7 x 0.6 cm intramammary lymph node at 3:00, 8cm FN\par \par 3/20/10-Core biopsy right breast at [facility] by Dr. Daniella Majano- atypical lobular hyperplasia\par \par She underwent a right breast lumpectomy on 3/29/18 by Dr. Swan. Pathology showed  \par 1-right breast 12:00 invasive ductal carcinoma  1.4 cm, grade G2. Margins negative greater than 10 mm. 3 sentinel nodes negative.\par \par Today pt. feels well. Still with right breast pain 2/10. Here to discuss RT options\par

## 2020-02-26 ENCOUNTER — APPOINTMENT (OUTPATIENT)
Dept: RADIATION ONCOLOGY | Facility: CLINIC | Age: 60
End: 2020-02-26

## 2020-06-03 ENCOUNTER — RESULT REVIEW (OUTPATIENT)
Age: 60
End: 2020-06-03

## 2020-10-20 NOTE — PACU DISCHARGE NOTE - NSCLINEINSERTRD_GEN_ALL_CORE
Patient is a 61y old  Female who presents with a chief complaint of COPD exacerbation, PNA (20 Oct 2020 11:16)      INTERVAL HPI/OVERNIGHT EVENTS: Patient seen and examined at bedside. No events overnight. Patient notes breathing is the same as yesterday. Notes mild improvement in leg swelling. Tolerated BIPAP well. Denies any HA, CP, n/v, diarrhea.    MEDICATIONS  (STANDING):  apixaban 5 milliGRAM(s) Oral every 12 hours  aspirin  chewable 81 milliGRAM(s) Oral daily  atorvastatin 80 milliGRAM(s) Oral at bedtime  azithromycin   Tablet 500 milliGRAM(s) Oral daily  Biotene Dry Mouth Oral Rinse 5 milliLiter(s) Swish and Spit two times a day  budesonide 160 MICROgram(s)/formoterol 4.5 MICROgram(s) Inhaler 2 Puff(s) Inhalation two times a day  buMETAnide 1 milliGRAM(s) Oral two times a day  cholecalciferol 1000 Unit(s) Oral daily  dextrose 5%. 1000 milliLiter(s) (50 mL/Hr) IV Continuous <Continuous>  dextrose 50% Injectable 12.5 Gram(s) IV Push once  dextrose 50% Injectable 25 Gram(s) IV Push once  dextrose 50% Injectable 25 Gram(s) IV Push once  diltiazem    milliGRAM(s) Oral daily  ferrous    sulfate 325 milliGRAM(s) Oral daily  guaiFENesin  milliGRAM(s) Oral every 12 hours  insulin glargine Injectable (LANTUS) 12 Unit(s) SubCutaneous at bedtime  insulin lispro (ADMELOG) corrective regimen sliding scale   SubCutaneous at bedtime  insulin lispro (ADMELOG) corrective regimen sliding scale.   SubCutaneous three times a day before meals  insulin lispro Injectable (ADMELOG) 4 Unit(s) SubCutaneous three times a day before meals  ipratropium    for Nebulization 500 MICROGram(s) Nebulizer every 6 hours  montelukast 10 milliGRAM(s) Oral at bedtime  multivitamin 1 Tablet(s) Oral daily  pantoprazole    Tablet 40 milliGRAM(s) Oral before breakfast  polyethylene glycol 3350 17 Gram(s) Oral daily  predniSONE   Tablet 20 milliGRAM(s) Oral daily  senna 2 Tablet(s) Oral at bedtime  tiotropium 18 MICROgram(s) Capsule 1 Capsule(s) Inhalation daily    MEDICATIONS  (PRN):  acetaminophen   Tablet .. 650 milliGRAM(s) Oral every 6 hours PRN Mild Pain (1 - 3)  ALPRAZolam 0.25 milliGRAM(s) Oral every 8 hours PRN anxiety  bisacodyl Suppository 10 milliGRAM(s) Rectal daily PRN Constipation  dextrose 40% Gel 15 Gram(s) Oral once PRN Blood Glucose LESS THAN 70 milliGRAM(s)/deciliter  glucagon  Injectable 1 milliGRAM(s) IntraMuscular once PRN Glucose LESS THAN 70 milligrams/deciliter  lactulose Syrup 15 Gram(s) Oral two times a day PRN constipation      Allergies    No Known Allergies    Intolerances    albuterol (Unknown)      REVIEW OF SYSTEMS:  CONSTITUTIONAL: No fever or chills  HEENT:  No headache, no sore throat  RESPIRATORY: No cough, wheezing, Positive for shortness of breath  CARDIOVASCULAR: No chest pain, palpitations. Positive for LE edema  GASTROINTESTINAL: No abd pain, nausea, vomiting, or diarrhea  GENITOURINARY: No dysuria, frequency, or hematuria  NEUROLOGICAL: no focal weakness or dizziness  MUSCULOSKELETAL: no myalgias     Vital Signs Last 24 Hrs  T(C): 36.5 (20 Oct 2020 06:23), Max: 36.6 (19 Oct 2020 12:57)  T(F): 97.7 (20 Oct 2020 06:23), Max: 97.9 (19 Oct 2020 12:57)  HR: 78 (20 Oct 2020 06:23) (70 - 89)  BP: 147/81 (20 Oct 2020 06:23) (128/68 - 147/81)  BP(mean): --  RR: 19 (20 Oct 2020 06:23) (17 - 19)  SpO2: 99% (20 Oct 2020 06:23) (95% - 99%)    PHYSICAL EXAM:  GENERAL: NAD,laying in bed comfortably on NC  HEENT:  anicteric, moist mucous membranes  CHEST/LUNG:  Decreased breath sounds in all fields. No rales, wheezes, or rhonchi  HEART:  RRR, S1, S2  ABDOMEN:  BS+, soft, nontender, nondistended  EXTREMITIES: no cyanosis, or calf tenderness. 2+ pitting edema of R leg up to knee, L leg w/ 1+ edema  NERVOUS SYSTEM: answers questions and follows commands appropriately    LABS:                        8.9    11.95 )-----------( 290      ( 20 Oct 2020 08:26 )             30.3     CBC Full  -  ( 20 Oct 2020 08:26 )  WBC Count : 11.95 K/uL  Hemoglobin : 8.9 g/dL  Hematocrit : 30.3 %  Platelet Count - Automated : 290 K/uL  Mean Cell Volume : 82.8 fl  Mean Cell Hemoglobin : 24.3 pg  Mean Cell Hemoglobin Concentration : 29.4 gm/dL  Auto Neutrophil # : 9.73 K/uL  Auto Lymphocyte # : 0.99 K/uL  Auto Monocyte # : 0.70 K/uL  Auto Eosinophil # : 0.28 K/uL  Auto Basophil # : 0.03 K/uL  Auto Neutrophil % : 81.4 %  Auto Lymphocyte % : 8.3 %  Auto Monocyte % : 5.9 %  Auto Eosinophil % : 2.3 %  Auto Basophil % : 0.3 %    20 Oct 2020 08:26    140    |  97     |  34     ----------------------------<  154    4.1     |  41     |  0.90     Ca    9.6        20 Oct 2020 08:26    TPro  6.5    /  Alb  2.6    /  TBili  0.5    /  DBili  x      /  AST  14     /  ALT  19     /  AlkPhos  63     20 Oct 2020 08:26        CAPILLARY BLOOD GLUCOSE      POCT Blood Glucose.: 157 mg/dL (20 Oct 2020 08:08)  POCT Blood Glucose.: 232 mg/dL (19 Oct 2020 21:18)  POCT Blood Glucose.: 388 mg/dL (19 Oct 2020 17:11)  POCT Blood Glucose.: 200 mg/dL (19 Oct 2020 12:15)          RADIOLOGY & ADDITIONAL TESTS:    Personally reviewed.     Consultant(s) Notes Reviewed:  [x] YES  [ ] NO     No

## 2022-02-09 ENCOUNTER — APPOINTMENT (OUTPATIENT)
Dept: COLORECTAL SURGERY | Facility: CLINIC | Age: 62
End: 2022-02-09
Payer: COMMERCIAL

## 2022-02-09 ENCOUNTER — NON-APPOINTMENT (OUTPATIENT)
Age: 62
End: 2022-02-09

## 2022-02-09 VITALS
SYSTOLIC BLOOD PRESSURE: 123 MMHG | DIASTOLIC BLOOD PRESSURE: 82 MMHG | TEMPERATURE: 98.2 F | WEIGHT: 135 LBS | HEIGHT: 61 IN | BODY MASS INDEX: 25.49 KG/M2 | HEART RATE: 102 BPM

## 2022-02-09 DIAGNOSIS — L29.9 PRURITUS, UNSPECIFIED: ICD-10-CM

## 2022-02-09 PROCEDURE — 99203 OFFICE O/P NEW LOW 30 MIN: CPT

## 2022-02-09 RX ORDER — CLOTRIMAZOLE AND BETAMETHASONE DIPROPIONATE 10; .5 MG/G; MG/G
1-0.05 CREAM TOPICAL
Qty: 1 | Refills: 0 | Status: ACTIVE | COMMUNITY
Start: 2022-02-09 | End: 1900-01-01

## 2022-02-09 RX ORDER — HYDROCORTISONE 25 MG/G
2.5 CREAM TOPICAL
Qty: 1 | Refills: 1 | Status: ACTIVE | COMMUNITY
Start: 2022-02-09 | End: 1900-01-01

## 2022-02-09 RX ORDER — NYSTATIN 100000 1/G
100000 POWDER TOPICAL
Qty: 1 | Refills: 1 | Status: ACTIVE | COMMUNITY
Start: 2022-02-09 | End: 1900-01-01

## 2022-02-09 NOTE — ASSESSMENT
[FreeTextEntry1] : Exam findings and diagnosis were discussed at length with patient.\par Recommend avoid scratching, avoid over-cleaning.\par Discussed perianal hygiene and topical barrier agents.\par Recommend a trial of topical therapy, including hydrocortisone, lotrisone and nystatin.\par Recommend f/u if symptoms persist or worsen despite supportive care.\par All questions answered, patient expressed understanding and is agreeable to this plan.\par

## 2022-02-09 NOTE — HISTORY OF PRESENT ILLNESS
[FreeTextEntry1] : 62 yo F presents for evaluation of possible cyst, referred by PCP Dr. Alcides Tate\par PMH hypothyroidism, on Synthroid, h/o Breast CA, s/p wide excision and RT, on anastrozole\par h/o  x1,  x 1\par PSH likely pilonidal cystectomy approx 30 years ago at [facility]\par \par Pt reports pilonidal cyst resolved after surgery and area healed w/o complaints\par Cyst symptom returned 3 years ago, when she felt an itchy bump and admits to bleeding after scratching the area. Given unknown (likely cortisone) cream by PCP 2 years ago which has intermittently use w/ improvement in itching\par Reports itch occasionally returns and worsens after scratching\par Denies pain, fever, body aches or chills, or discharge\par \par BH: daily, no complaints\par Denies rectal pain or bleeding\par Denies h/o anogenital warts\par \par Last colonoscopy 2021 which was otherwise normal per patient, recommended repeat in 8 years\par Denies [facility] CRC or IBD. Mother w/ breast CA

## 2022-02-09 NOTE — PHYSICAL EXAM
[FreeTextEntry1] : Medical assistant present for duration of physical examination\par \par General no acute distress, alert and oriented\par Psych calm, pleasant demeanor, responding appropriately to questions\par Nonlabored breathing\par Ambulating without assistance\par \par Skin local dermatitis with superficial excoriations and breaks in skin overlying coccygeal region in intergluteal cleft, evidence of prior surgical scar in midline, no palpable cyst of pilonidal pits visualized.\par \par Anorectal Exam:\par Inspection no erythema, induration or fluctuance, no skin excoriation of perianal skin \par

## 2022-06-10 NOTE — ASU PATIENT PROFILE, ADULT - PRO INTERPRETER NEED 2
Writer attempted to contact Mary boyle voice message that prescription has been sent to [facility] pharmacy and any further questions to please contact the clinic.  
English
No

## 2022-11-10 ENCOUNTER — RESULT REVIEW (OUTPATIENT)
Age: 62
End: 2022-11-10

## 2024-01-31 ENCOUNTER — EMERGENCY (EMERGENCY)
Facility: HOSPITAL | Age: 64
LOS: 1 days | Discharge: HOME | End: 2024-01-31
Attending: STUDENT IN AN ORGANIZED HEALTH CARE EDUCATION/TRAINING PROGRAM | Admitting: STUDENT IN AN ORGANIZED HEALTH CARE EDUCATION/TRAINING PROGRAM
Payer: COMMERCIAL

## 2024-01-31 VITALS
RESPIRATION RATE: 18 BRPM | HEIGHT: 61 IN | WEIGHT: 139.99 LBS | OXYGEN SATURATION: 98 % | HEART RATE: 116 BPM | SYSTOLIC BLOOD PRESSURE: 133 MMHG | DIASTOLIC BLOOD PRESSURE: 69 MMHG | TEMPERATURE: 99 F

## 2024-01-31 VITALS
OXYGEN SATURATION: 98 % | DIASTOLIC BLOOD PRESSURE: 82 MMHG | RESPIRATION RATE: 18 BRPM | HEART RATE: 88 BPM | TEMPERATURE: 98 F | SYSTOLIC BLOOD PRESSURE: 153 MMHG

## 2024-01-31 DIAGNOSIS — R10.30 LOWER ABDOMINAL PAIN, UNSPECIFIED: ICD-10-CM

## 2024-01-31 DIAGNOSIS — Z98.890 OTHER SPECIFIED POSTPROCEDURAL STATES: Chronic | ICD-10-CM

## 2024-01-31 DIAGNOSIS — Z98.891 HISTORY OF UTERINE SCAR FROM PREVIOUS SURGERY: Chronic | ICD-10-CM

## 2024-01-31 DIAGNOSIS — R35.0 FREQUENCY OF MICTURITION: ICD-10-CM

## 2024-01-31 LAB
ALBUMIN SERPL ELPH-MCNC: 4.4 G/DL — SIGNIFICANT CHANGE UP (ref 3.3–5)
ALP SERPL-CCNC: 98 U/L — SIGNIFICANT CHANGE UP (ref 40–120)
ALT FLD-CCNC: 14 U/L — SIGNIFICANT CHANGE UP (ref 10–45)
ANION GAP SERPL CALC-SCNC: 10 MMOL/L — SIGNIFICANT CHANGE UP (ref 5–17)
APPEARANCE UR: CLEAR — SIGNIFICANT CHANGE UP
AST SERPL-CCNC: 19 U/L — SIGNIFICANT CHANGE UP (ref 10–40)
BACTERIA # UR AUTO: NEGATIVE /HPF — SIGNIFICANT CHANGE UP
BASOPHILS # BLD AUTO: 0.04 K/UL — SIGNIFICANT CHANGE UP (ref 0–0.2)
BASOPHILS NFR BLD AUTO: 0.4 % — SIGNIFICANT CHANGE UP (ref 0–2)
BILIRUB SERPL-MCNC: <0.2 MG/DL — SIGNIFICANT CHANGE UP (ref 0.2–1.2)
BILIRUB UR-MCNC: NEGATIVE — SIGNIFICANT CHANGE UP
BUN SERPL-MCNC: 12 MG/DL — SIGNIFICANT CHANGE UP (ref 7–23)
CALCIUM SERPL-MCNC: 9.8 MG/DL — SIGNIFICANT CHANGE UP (ref 8.4–10.5)
CAST: 1 /LPF — SIGNIFICANT CHANGE UP (ref 0–4)
CHLORIDE SERPL-SCNC: 104 MMOL/L — SIGNIFICANT CHANGE UP (ref 96–108)
CO2 SERPL-SCNC: 26 MMOL/L — SIGNIFICANT CHANGE UP (ref 22–31)
COLOR SPEC: YELLOW — SIGNIFICANT CHANGE UP
CREAT SERPL-MCNC: 0.66 MG/DL — SIGNIFICANT CHANGE UP (ref 0.5–1.3)
DIFF PNL FLD: ABNORMAL
EGFR: 98 ML/MIN/1.73M2 — SIGNIFICANT CHANGE UP
EOSINOPHIL # BLD AUTO: 0.19 K/UL — SIGNIFICANT CHANGE UP (ref 0–0.5)
EOSINOPHIL NFR BLD AUTO: 1.9 % — SIGNIFICANT CHANGE UP (ref 0–6)
GLUCOSE SERPL-MCNC: 133 MG/DL — HIGH (ref 70–99)
GLUCOSE UR QL: NEGATIVE MG/DL — SIGNIFICANT CHANGE UP
HCT VFR BLD CALC: 40.6 % — SIGNIFICANT CHANGE UP (ref 34.5–45)
HGB BLD-MCNC: 13.2 G/DL — SIGNIFICANT CHANGE UP (ref 11.5–15.5)
IMM GRANULOCYTES NFR BLD AUTO: 0.4 % — SIGNIFICANT CHANGE UP (ref 0–0.9)
KETONES UR-MCNC: NEGATIVE MG/DL — SIGNIFICANT CHANGE UP
LEUKOCYTE ESTERASE UR-ACNC: ABNORMAL
LIDOCAIN IGE QN: 55 U/L — SIGNIFICANT CHANGE UP (ref 7–60)
LYMPHOCYTES # BLD AUTO: 2.36 K/UL — SIGNIFICANT CHANGE UP (ref 1–3.3)
LYMPHOCYTES # BLD AUTO: 23.4 % — SIGNIFICANT CHANGE UP (ref 13–44)
MCHC RBC-ENTMCNC: 27.3 PG — SIGNIFICANT CHANGE UP (ref 27–34)
MCHC RBC-ENTMCNC: 32.5 GM/DL — SIGNIFICANT CHANGE UP (ref 32–36)
MCV RBC AUTO: 84.1 FL — SIGNIFICANT CHANGE UP (ref 80–100)
MONOCYTES # BLD AUTO: 0.63 K/UL — SIGNIFICANT CHANGE UP (ref 0–0.9)
MONOCYTES NFR BLD AUTO: 6.2 % — SIGNIFICANT CHANGE UP (ref 2–14)
NEUTROPHILS # BLD AUTO: 6.83 K/UL — SIGNIFICANT CHANGE UP (ref 1.8–7.4)
NEUTROPHILS NFR BLD AUTO: 67.7 % — SIGNIFICANT CHANGE UP (ref 43–77)
NITRITE UR-MCNC: NEGATIVE — SIGNIFICANT CHANGE UP
NRBC # BLD: 0 /100 WBCS — SIGNIFICANT CHANGE UP (ref 0–0)
PH UR: 5.5 — SIGNIFICANT CHANGE UP (ref 5–8)
PLATELET # BLD AUTO: 352 K/UL — SIGNIFICANT CHANGE UP (ref 150–400)
POTASSIUM SERPL-MCNC: 4 MMOL/L — SIGNIFICANT CHANGE UP (ref 3.5–5.3)
POTASSIUM SERPL-SCNC: 4 MMOL/L — SIGNIFICANT CHANGE UP (ref 3.5–5.3)
PROT SERPL-MCNC: 7.4 G/DL — SIGNIFICANT CHANGE UP (ref 6–8.3)
PROT UR-MCNC: NEGATIVE MG/DL — SIGNIFICANT CHANGE UP
RBC # BLD: 4.83 M/UL — SIGNIFICANT CHANGE UP (ref 3.8–5.2)
RBC # FLD: 14.2 % — SIGNIFICANT CHANGE UP (ref 10.3–14.5)
RBC CASTS # UR COMP ASSIST: 6 /HPF — HIGH (ref 0–4)
SODIUM SERPL-SCNC: 140 MMOL/L — SIGNIFICANT CHANGE UP (ref 135–145)
SP GR SPEC: 1.02 — SIGNIFICANT CHANGE UP (ref 1–1.03)
SQUAMOUS # UR AUTO: 2 /HPF — SIGNIFICANT CHANGE UP (ref 0–5)
UROBILINOGEN FLD QL: 0.2 MG/DL — SIGNIFICANT CHANGE UP (ref 0.2–1)
WBC # BLD: 10.09 K/UL — SIGNIFICANT CHANGE UP (ref 3.8–10.5)
WBC # FLD AUTO: 10.09 K/UL — SIGNIFICANT CHANGE UP (ref 3.8–10.5)
WBC UR QL: 38 /HPF — HIGH (ref 0–5)

## 2024-01-31 PROCEDURE — 85025 COMPLETE CBC W/AUTO DIFF WBC: CPT

## 2024-01-31 PROCEDURE — 74176 CT ABD & PELVIS W/O CONTRAST: CPT | Mod: 26,MA

## 2024-01-31 PROCEDURE — 80053 COMPREHEN METABOLIC PANEL: CPT

## 2024-01-31 PROCEDURE — 99284 EMERGENCY DEPT VISIT MOD MDM: CPT | Mod: 25

## 2024-01-31 PROCEDURE — 83690 ASSAY OF LIPASE: CPT

## 2024-01-31 PROCEDURE — 36415 COLL VENOUS BLD VENIPUNCTURE: CPT

## 2024-01-31 PROCEDURE — 87086 URINE CULTURE/COLONY COUNT: CPT

## 2024-01-31 PROCEDURE — 81001 URINALYSIS AUTO W/SCOPE: CPT

## 2024-01-31 PROCEDURE — 74176 CT ABD & PELVIS W/O CONTRAST: CPT | Mod: MA

## 2024-01-31 PROCEDURE — 99284 EMERGENCY DEPT VISIT MOD MDM: CPT

## 2024-01-31 RX ORDER — CEFUROXIME AXETIL 250 MG
1 TABLET ORAL
Qty: 14 | Refills: 0
Start: 2024-01-31 | End: 2024-02-06

## 2024-01-31 NOTE — ED PROVIDER NOTE - OBJECTIVE STATEMENT
63f no relevent pmhx. 3 days of mild lower abdominal discomfort5. started aftyer going to the gym. no NVD. mild urinary frequency.

## 2024-01-31 NOTE — ED ADULT TRIAGE NOTE - CHIEF COMPLAINT QUOTE
abdominal pain x 3 days diffuse lower quadrant, with blood detected in urine trace amts by UC, pt. denies visible hematuria. Denues urinary s/s, f/c, cp, SOB. No hx of abd. sgys. NAD @ rest.

## 2024-01-31 NOTE — ED ADULT NURSE NOTE - OBJECTIVE STATEMENT
Pt is 85 y.o female pt came in here for abdominal pain x 3 days diffuse lower quadrant, with blood trace in the urine by UC. Denies urinary s/s, f/c, cp, SOB. No hx of abd surgery, no CP or SOB. Pt A&Ox4, conversive in full sentences and ambulatory. Assessment ongoing. IV inserted abd labs sent. Pt is 85 y.o female pt came in here for abdominal pain x 3 days diffuse lower quadrant, with blood trace in the urine by UC. Denies urinary s/s, f/c, cp, SOB. No hx of abd surgery, no CP or SOB. Pt A&Ox4, conversive in full sentences and ambulatory. Assessment ongoing. IV inserted abd labs sent. PMHx of breast CA had lumpectomy R 5 years ago.

## 2024-01-31 NOTE — ED PROVIDER NOTE - PATIENT PORTAL LINK FT
You can access the FollowMyHealth Patient Portal offered by [facility] by registering at the following website: [email or web address]. By joining [employer]’s FollowMyHealth portal, you will also be able to view your health information using other applications (apps) compatible with our system.

## 2024-01-31 NOTE — ED ADULT TRIAGE NOTE - PRO INTERPRETER NEED 2
organization: Not on file     Attends meetings of clubs or organizations: Not on file     Relationship status: Not on file    Intimate partner violence     Fear of current or ex partner: Not on file     Emotionally abused: Not on file     Physically abused: Not on file     Forced sexual activity: Not on file   Other Topics Concern    Not on file   Social History Narrative    Not on file        Family History   Problem Relation Age of Onset    Heart Disease Mother 79    Diabetes Mother     Cancer Mother         Breast    Hypertension Father     Cancer Father         Pancreas    Diabetes Father     High Blood Pressure Father     Arthritis Brother     Diabetes Brother     Cancer Maternal Uncle     Cancer Paternal Aunt         breast    High Blood Pressure Paternal Aunt     High Blood Pressure Paternal Uncle     Arthritis Maternal Grandmother     Diabetes Maternal Grandmother     High Blood Pressure Maternal Grandmother     Arthritis Maternal Grandfather     Stroke Maternal Grandfather     High Cholesterol Paternal Grandmother     Kidney Disease Paternal Grandmother     Heart Disease Paternal Grandfather        REVIEW OF SYSTEMS (Recent symptoms): Negative if blank [], Positive if [x]       Constitutional    [] Fevers / chills  [] General weakness   [] Poor appetite    [] Weight gain or loss  Eyes    [] Blurred vision  [] Wear glasses / contacts   [] Visual disturbances   [] Blindness  Ears, Nose, Throat    [] Hearing loss  [] Ringing in ears   [] Nose bleeding    [] Voice hoarseness  [] Difficulty swallowing      Lungs    [] Cough  [] Chest pain   [] Wheezing    [] Difficult breathing  Heart    [] Chest pain during activity  [] Dizziness   [] Irregular heart beat    [] Fainting episode    [] Leg swelling   Stomach, Intestines    [] Intestinal bleeding  [] Heart burn   [] Abdominal pain    [] Stomach ulcers   [] Change in bowel habits      Kidney, Prostate    [] Frequent need to urinate  [] English

## 2024-01-31 NOTE — ED ADULT NURSE NOTE - NS ED NURSE RECORD ANOTHER HT AND WT
[address] [facility]  Provider- YEIMI Dixon  Provider- YEIMI Tipton  [address] [address]  Re[place]- [place], [place]      Walk-in contact numbers:       Phone: [phone]                 Fax: [phone]    [facility] Walk-in Hours:  Mon-Thurs: 9:00 am - 5:30 pm     Friday: 8:00 am - 12:00 pm           Sat-Sun: CLOSED Yes

## 2024-01-31 NOTE — ED ADULT NURSE NOTE - NS PRO PASSIVE SMOKE EXP
Sujatha      Last Written Prescription Date: 7/17/17  Last Fill Quantity: 30, # refills: 0  Last Office Visit with [facility], [facility] or [facility] prescribing provider: 8/19/17 E-visit  Next 5 appointments (look out 90 days)     Aug 25, 2017  3:00 PM CDT   Nurse Only with [facility] - NURSE   [facility]    [address] [address]   [phone]            Sep 08, 2017 11:00 AM CDT   Nurse Only with [facility] - NURSE   [facility]    [address] [address]   [phone]            Sep 22, 2017  8:50 AM CDT   Callie Pitts with Temi Marsh MD   [facility]    [address] [place] [id number] [phone]                   Potassium   Date Value Ref Range Status   03/10/2017 3.9 3.4 - 5.3 mmol/L Final     Creatinine   Date Value Ref Range Status   03/10/2017 0.47 (L) 0.52 - 1.04 mg/dL Final     BP Readings from Last 3 Encounters:   07/13/17 132/90   05/08/17 (!) 132/98   03/13/17 (!) 138/94        Unknown

## 2024-01-31 NOTE — ED PROVIDER NOTE - PHYSICAL EXAMINATION
General: Awake, alert and oriented. No acute distress.   Skin:  Appropriate color for ethnicity  HENMT: head normocephalic and atraumatic  EYES: Conjunctiva clear. nonicteric sclera  Cardiac: well perfused  Respiratory: breathing comfortably on room air  Abdominal: nondistended, soft, nontender, mild suprapubic ttp.   MSK:  no visualized external signs of trauma. no apparent deficits in ROM of any extremity  Neurological: The patient is awake, alert and oriented with normal speech. CN 2-12 grossly intact. no apparent deficits. Memory is normal and thought process is intact. moving all extremities spontaneously   Psychiatric: Appropriate mood and affect. Good judgement and insight

## 2024-02-02 LAB
CULTURE RESULTS: SIGNIFICANT CHANGE UP
SPECIMEN SOURCE: SIGNIFICANT CHANGE UP